# Patient Record
Sex: FEMALE | Race: ASIAN | NOT HISPANIC OR LATINO | Employment: FULL TIME | URBAN - METROPOLITAN AREA
[De-identification: names, ages, dates, MRNs, and addresses within clinical notes are randomized per-mention and may not be internally consistent; named-entity substitution may affect disease eponyms.]

---

## 2023-02-20 LAB
ERYTHROCYTE DISTRIBUTION WIDTH (RATIO) BY AUTOMATED COUNT: 13.4 % (ref 11.5–14.5)
ERYTHROCYTE MEAN CORPUSCULAR HEMOGLOBIN CONCENTRATION (G/DL) BY AUTOMATED: 32.3 G/DL (ref 32–36)
ERYTHROCYTE MEAN CORPUSCULAR VOLUME (FL) BY AUTOMATED COUNT: 99 FL (ref 80–100)
ERYTHROCYTES (10*6/UL) IN BLOOD BY AUTOMATED COUNT: 3.8 X10E12/L (ref 4–5.2)
HEMATOCRIT (%) IN BLOOD BY AUTOMATED COUNT: 37.8 % (ref 36–46)
HEMOGLOBIN (G/DL) IN BLOOD: 12.2 G/DL (ref 12–16)
LEUKOCYTES (10*3/UL) IN BLOOD BY AUTOMATED COUNT: 6.1 X10E9/L (ref 4.4–11.3)
PLATELETS (10*3/UL) IN BLOOD AUTOMATED COUNT: 271 X10E9/L (ref 150–450)
REFLEX ADDED, ANEMIA PANEL: ABNORMAL

## 2023-02-21 LAB
ABO GROUP (TYPE) IN BLOOD: NORMAL
ANTIBODY SCREEN: NORMAL
CHLAMYDIA TRACH., AMPLIFIED: NEGATIVE
ESTIMATED AVERAGE GLUCOSE FOR HBA1C: 97 MG/DL
HEMOGLOBIN A1C/HEMOGLOBIN TOTAL IN BLOOD: 5 %
HEPATITIS B VIRUS SURFACE AG PRESENCE IN SERUM: NONREACTIVE
HEPATITIS C VIRUS AB PRESENCE IN SERUM: NONREACTIVE
HIV 1/ 2 AG/AB SCREEN: NONREACTIVE
N. GONORRHEA, AMPLIFIED: NEGATIVE
RH FACTOR: NORMAL
RUBELLA VIRUS IGG AB: POSITIVE
SYPHILIS TOTAL AB: NONREACTIVE
URINE CULTURE: NORMAL

## 2023-03-31 ENCOUNTER — OFFICE VISIT (OUTPATIENT)
Dept: PRIMARY CARE | Facility: CLINIC | Age: 33
End: 2023-03-31
Payer: COMMERCIAL

## 2023-03-31 VITALS
OXYGEN SATURATION: 98 % | DIASTOLIC BLOOD PRESSURE: 76 MMHG | HEART RATE: 80 BPM | TEMPERATURE: 98.2 F | RESPIRATION RATE: 16 BRPM | WEIGHT: 174 LBS | BODY MASS INDEX: 29.87 KG/M2 | SYSTOLIC BLOOD PRESSURE: 122 MMHG

## 2023-03-31 DIAGNOSIS — B30.9 ACUTE VIRAL CONJUNCTIVITIS OF BOTH EYES: Primary | ICD-10-CM

## 2023-03-31 DIAGNOSIS — J01.90 ACUTE BACTERIAL SINUSITIS: ICD-10-CM

## 2023-03-31 DIAGNOSIS — B96.89 ACUTE BACTERIAL SINUSITIS: ICD-10-CM

## 2023-03-31 DIAGNOSIS — H00.034 CELLULITIS OF LEFT UPPER EYELID: ICD-10-CM

## 2023-03-31 PROCEDURE — 1036F TOBACCO NON-USER: CPT | Performed by: NURSE PRACTITIONER

## 2023-03-31 PROCEDURE — 99204 OFFICE O/P NEW MOD 45 MIN: CPT | Performed by: NURSE PRACTITIONER

## 2023-03-31 RX ORDER — FOLIC ACID 1 MG/1
1 TABLET ORAL DAILY
COMMUNITY
End: 2023-11-10 | Stop reason: ALTCHOICE

## 2023-03-31 RX ORDER — DIPHENHYDRAMINE HCL 25 MG
25 CAPSULE ORAL EVERY 6 HOURS PRN
COMMUNITY
End: 2023-11-10 | Stop reason: ALTCHOICE

## 2023-03-31 RX ORDER — AMOXICILLIN AND CLAVULANATE POTASSIUM 875; 125 MG/1; MG/1
1 TABLET, FILM COATED ORAL 2 TIMES DAILY
Qty: 14 TABLET | Refills: 0 | Status: SHIPPED | OUTPATIENT
Start: 2023-03-31 | End: 2023-04-07

## 2023-03-31 RX ORDER — PYRIDOXINE HCL (VITAMIN B6) 25 MG
50 TABLET ORAL 3 TIMES DAILY
COMMUNITY
End: 2023-11-10 | Stop reason: ALTCHOICE

## 2023-03-31 ASSESSMENT — ENCOUNTER SYMPTOMS
PHOTOPHOBIA: 1
EYE REDNESS: 1
EYE DISCHARGE: 1
EYE ITCHING: 1
EYE PAIN: 1

## 2023-03-31 ASSESSMENT — VISUAL ACUITY: OU: 1

## 2023-03-31 NOTE — PROGRESS NOTES
Subjective   Patient ID: Lisa Bernal is a 32 y.o. female who presents for Conjunctivitis.    Conjunctivitis   The current episode started 3 to 5 days ago. The onset was sudden. The problem occurs occasionally. The problem has been unchanged. Associated symptoms include eye itching, photophobia, ear discharge, eye discharge, eye pain and eye redness. The eye pain is moderate. Both eyes are affected. The eyelid exhibits redness and swelling.    States she had clear drainage, but changed to blood-tinged from left eye with increased pressure in left eyelid beginning this a.m. She did start Tobramycin eye drops on Monday.  Of note, pt. Is 14 weeks pregnant.    Review of Systems   HENT:  Positive for ear discharge.    Eyes:  Positive for photophobia, pain, discharge, redness and itching.       Objective   /76   Pulse 80   Temp 36.8 °C (98.2 °F) (Temporal)   Resp 16   Wt 78.9 kg (174 lb)   LMP 08/04/2022   SpO2 98%   BMI 29.87 kg/m²     Physical Exam  Vitals reviewed.   Constitutional:       Appearance: Normal appearance.   HENT:      Head: Normocephalic.   Eyes:      General: Vision grossly intact.         Right eye: Discharge present.         Left eye: Discharge present.     Conjunctiva/sclera:      Right eye: Right conjunctiva is injected.      Left eye: Left conjunctiva is injected. Chemosis present.      Comments: Left upper eyelid very tender on light palpation w/ erythema and edema of the lid.    Cardiovascular:      Rate and Rhythm: Normal rate and regular rhythm.      Pulses: Normal pulses.   Pulmonary:      Breath sounds: Normal breath sounds.   Abdominal:      General: Bowel sounds are normal.      Palpations: Abdomen is soft.   Musculoskeletal:      Cervical back: Neck supple.   Skin:     General: Skin is warm and dry.   Neurological:      General: No focal deficit present.      Mental Status: She is alert.   Psychiatric:         Mood and Affect: Mood normal.         Thought Content: Thought  content normal.       Assessment/Plan   Problem List Items Addressed This Visit    None  Visit Diagnoses       Acute viral conjunctivitis of both eyes    -  Primary    Cellulitis of left upper eyelid        Acute bacterial sinusitis        Relevant Medications    amoxicillin-pot clavulanate (Augmentin) 875-125 mg tablet          Refer to optometry d/t reported bloody drainage from left eye this a.m.

## 2023-05-27 ENCOUNTER — HOSPITAL ENCOUNTER (OUTPATIENT)
Dept: DATA CONVERSION | Facility: HOSPITAL | Age: 33
End: 2023-05-27
Attending: STUDENT IN AN ORGANIZED HEALTH CARE EDUCATION/TRAINING PROGRAM
Payer: COMMERCIAL

## 2023-05-27 DIAGNOSIS — Z34.80 ENCOUNTER FOR SUPERVISION OF OTHER NORMAL PREGNANCY, UNSPECIFIED TRIMESTER (HHS-HCC): ICD-10-CM

## 2023-05-27 DIAGNOSIS — N63.0 UNSPECIFIED LUMP IN UNSPECIFIED BREAST: ICD-10-CM

## 2023-05-27 DIAGNOSIS — Z3A.22 22 WEEKS GESTATION OF PREGNANCY (HHS-HCC): ICD-10-CM

## 2023-05-27 DIAGNOSIS — O92.29: ICD-10-CM

## 2023-05-27 LAB
ABO GROUP (TYPE) IN BLOOD: NORMAL
ANTIBODY SCREEN: NORMAL
BASOPHILS (10*3/UL) IN BLOOD BY AUTOMATED COUNT: 0.01 X10E9/L (ref 0–0.1)
BASOPHILS/100 LEUKOCYTES IN BLOOD BY AUTOMATED COUNT: 0.1 % (ref 0–2)
EOSINOPHILS (10*3/UL) IN BLOOD BY AUTOMATED COUNT: 0.03 X10E9/L (ref 0–0.7)
EOSINOPHILS/100 LEUKOCYTES IN BLOOD BY AUTOMATED COUNT: 0.4 % (ref 0–6)
ERYTHROCYTE DISTRIBUTION WIDTH (RATIO) BY AUTOMATED COUNT: 13.3 % (ref 11.5–14.5)
ERYTHROCYTE MEAN CORPUSCULAR HEMOGLOBIN CONCENTRATION (G/DL) BY AUTOMATED: 32.4 G/DL (ref 32–36)
ERYTHROCYTE MEAN CORPUSCULAR VOLUME (FL) BY AUTOMATED COUNT: 99 FL (ref 80–100)
ERYTHROCYTES (10*6/UL) IN BLOOD BY AUTOMATED COUNT: 3.17 X10E12/L (ref 4–5.2)
HEMATOCRIT (%) IN BLOOD BY AUTOMATED COUNT: 31.5 % (ref 36–46)
HEMOGLOBIN (G/DL) IN BLOOD: 10.2 G/DL (ref 12–16)
IMMATURE GRANULOCYTES/100 LEUKOCYTES IN BLOOD BY AUTOMATED COUNT: 0.3 % (ref 0–0.9)
LEUKOCYTES (10*3/UL) IN BLOOD BY AUTOMATED COUNT: 7.7 X10E9/L (ref 4.4–11.3)
LYMPHOCYTES (10*3/UL) IN BLOOD BY AUTOMATED COUNT: 1.12 X10E9/L (ref 1.2–4.8)
LYMPHOCYTES/100 LEUKOCYTES IN BLOOD BY AUTOMATED COUNT: 14.6 % (ref 13–44)
MONOCYTES (10*3/UL) IN BLOOD BY AUTOMATED COUNT: 0.58 X10E9/L (ref 0.1–1)
MONOCYTES/100 LEUKOCYTES IN BLOOD BY AUTOMATED COUNT: 7.6 % (ref 2–10)
NEUTROPHILS (10*3/UL) IN BLOOD BY AUTOMATED COUNT: 5.91 X10E9/L (ref 1.2–7.7)
NEUTROPHILS/100 LEUKOCYTES IN BLOOD BY AUTOMATED COUNT: 77 % (ref 40–80)
NRBC (PER 100 WBCS) BY AUTOMATED COUNT: 0 /100 WBC (ref 0–0)
PLATELETS (10*3/UL) IN BLOOD AUTOMATED COUNT: 273 X10E9/L (ref 150–450)
RH FACTOR: NORMAL

## 2023-05-30 LAB
GRAM STAIN: ABNORMAL
TISSUE/WOUND CULTURE/SMEAR: ABNORMAL

## 2023-06-28 LAB
ERYTHROCYTE DISTRIBUTION WIDTH (RATIO) BY AUTOMATED COUNT: 13.7 % (ref 11.5–14.5)
ERYTHROCYTE MEAN CORPUSCULAR HEMOGLOBIN CONCENTRATION (G/DL) BY AUTOMATED: 32.1 G/DL (ref 32–36)
ERYTHROCYTE MEAN CORPUSCULAR VOLUME (FL) BY AUTOMATED COUNT: 99 FL (ref 80–100)
ERYTHROCYTES (10*6/UL) IN BLOOD BY AUTOMATED COUNT: 3.12 X10E12/L (ref 4–5.2)
GLUCOSE, 1 HR SCREEN, PREG: 113 MG/DL
HEMATOCRIT (%) IN BLOOD BY AUTOMATED COUNT: 30.8 % (ref 36–46)
HEMOGLOBIN (G/DL) IN BLOOD: 9.9 G/DL (ref 12–16)
LEUKOCYTES (10*3/UL) IN BLOOD BY AUTOMATED COUNT: 8.5 X10E9/L (ref 4.4–11.3)
PLATELETS (10*3/UL) IN BLOOD AUTOMATED COUNT: 319 X10E9/L (ref 150–450)
REFLEX ADDED, ANEMIA PANEL: ABNORMAL

## 2023-06-29 LAB
FERRITIN, PREGNANCY: 51 UG/L
FOLATE, SERUM, PREGNANCY: 15.8 NG/ML
IRON (UG/DL) IN SER/PLAS IN PREGNANCY: 60 UG/DL
IRON BINDING CAPACITY (UG/DL) IN PREGNANCY: 441 UG/DL
IRON SATURATION (%) IN PREGNANCY: 14 %
VITAMIN B12, PREGNANCY: 237 PG/ML

## 2023-07-08 LAB
GRAM STAIN: NORMAL
TISSUE/WOUND CULTURE/SMEAR: NORMAL

## 2023-07-14 LAB
ERYTHROCYTE DISTRIBUTION WIDTH (RATIO) BY AUTOMATED COUNT: 14.6 % (ref 11.5–14.5)
ERYTHROCYTE MEAN CORPUSCULAR HEMOGLOBIN CONCENTRATION (G/DL) BY AUTOMATED: 31.6 G/DL (ref 32–36)
ERYTHROCYTE MEAN CORPUSCULAR VOLUME (FL) BY AUTOMATED COUNT: 100 FL (ref 80–100)
ERYTHROCYTES (10*6/UL) IN BLOOD BY AUTOMATED COUNT: 3.1 X10E12/L (ref 4–5.2)
HEMATOCRIT (%) IN BLOOD BY AUTOMATED COUNT: 31 % (ref 36–46)
HEMOGLOBIN (G/DL) IN BLOOD: 9.8 G/DL (ref 12–16)
HEMOGLOBIN (PG) IN RETICULOCYTES: 32 PG (ref 28–38)
LEUKOCYTES (10*3/UL) IN BLOOD BY AUTOMATED COUNT: 6.8 X10E9/L (ref 4.4–11.3)
PLATELETS (10*3/UL) IN BLOOD AUTOMATED COUNT: 293 X10E9/L (ref 150–450)
REFLEX ADDED, ANEMIA PANEL: ABNORMAL
RETICULOCYTES (10*3/UL) IN BLOOD: 0.07 X10E12/L (ref 0.02–0.08)
RETICULOCYTES/100 ERYTHROCYTES IN BLOOD BY AUTOMATED COUNT: 2.4 % (ref 0.5–2)

## 2023-07-15 LAB
FERRITIN, PREGNANCY: 38 UG/L
FOLATE, SERUM, PREGNANCY: 9.3 NG/ML
IRON (UG/DL) IN SER/PLAS IN PREGNANCY: 62 UG/DL
IRON BINDING CAPACITY (UG/DL) IN PREGNANCY: 480 UG/DL
IRON SATURATION (%) IN PREGNANCY: 13 %
VITAMIN B12, PREGNANCY: 166 PG/ML

## 2023-08-26 PROBLEM — J06.9 VIRAL URI: Status: ACTIVE | Noted: 2023-08-26

## 2023-08-26 PROBLEM — L02.91 ABSCESS: Status: ACTIVE | Noted: 2023-08-26

## 2023-08-26 PROBLEM — N64.4 BREAST PAIN: Status: ACTIVE | Noted: 2023-08-26

## 2023-08-26 PROBLEM — R92.8 ABNORMAL MAMMOGRAM OF RIGHT BREAST: Status: ACTIVE | Noted: 2023-08-26

## 2023-08-26 PROBLEM — F41.9 ANXIETY: Status: ACTIVE | Noted: 2023-08-26

## 2023-08-26 PROBLEM — N63.10 LUMP OF RIGHT BREAST: Status: ACTIVE | Noted: 2023-08-26

## 2023-08-26 PROBLEM — N64.59 ABNORMAL BREAST EXAM: Status: ACTIVE | Noted: 2023-08-26

## 2023-08-26 PROBLEM — O99.019 ANEMIA IN PREGNANCY (HHS-HCC): Status: ACTIVE | Noted: 2023-08-26

## 2023-08-26 RX ORDER — DOCUSATE SODIUM 100 MG/1
100 CAPSULE, LIQUID FILLED ORAL 2 TIMES DAILY PRN
COMMUNITY
Start: 2021-06-11 | End: 2024-03-11 | Stop reason: ALTCHOICE

## 2023-08-26 RX ORDER — IBUPROFEN 600 MG/1
600 TABLET ORAL EVERY 6 HOURS PRN
COMMUNITY
Start: 2021-06-11 | End: 2024-03-11 | Stop reason: ALTCHOICE

## 2023-08-26 RX ORDER — AMOXICILLIN 500 MG/1
CAPSULE ORAL
COMMUNITY
Start: 2022-10-06 | End: 2023-11-02 | Stop reason: ALTCHOICE

## 2023-08-26 RX ORDER — DROSPIRENONE 4 MG/1
1 TABLET, FILM COATED ORAL DAILY
COMMUNITY
Start: 2021-06-11 | End: 2023-11-10 | Stop reason: ALTCHOICE

## 2023-08-26 RX ORDER — ACYCLOVIR 800 MG/1
800 TABLET ORAL
COMMUNITY
Start: 2023-05-09 | End: 2023-11-10 | Stop reason: ALTCHOICE

## 2023-08-26 RX ORDER — FERROUS SULFATE 325(65) MG
65 TABLET ORAL 2 TIMES DAILY
COMMUNITY
End: 2023-11-10 | Stop reason: ALTCHOICE

## 2023-08-26 RX ORDER — GABAPENTIN 100 MG/1
100 CAPSULE ORAL 2 TIMES DAILY
COMMUNITY
Start: 2023-05-09 | End: 2023-11-10 | Stop reason: ALTCHOICE

## 2023-08-26 RX ORDER — TOBRAMYCIN 3 MG/ML
SOLUTION/ DROPS OPHTHALMIC
COMMUNITY
Start: 2023-05-24 | End: 2023-11-10 | Stop reason: ALTCHOICE

## 2023-08-26 RX ORDER — CEPHALEXIN 500 MG/1
500 CAPSULE ORAL 4 TIMES DAILY
COMMUNITY
Start: 2023-05-24 | End: 2023-11-02 | Stop reason: ALTCHOICE

## 2023-08-26 RX ORDER — ACETAMINOPHEN 500 MG
1000 TABLET ORAL EVERY 6 HOURS PRN
COMMUNITY
Start: 2021-06-11 | End: 2023-11-10 | Stop reason: ALTCHOICE

## 2023-09-07 VITALS — BODY MASS INDEX: 30.3 KG/M2 | WEIGHT: 177.47 LBS | HEIGHT: 64 IN

## 2023-09-09 LAB — GROUP B STREP SCREEN: NORMAL

## 2023-09-30 NOTE — PROGRESS NOTES
"    Current Stage:   Stage: Triage     Subjective Data:   Antepartum:  Antepartum:    31yo  at 22.0wga by LMP c/w 7wk u/s who presents for breast mass.     Pt reports noticing R breast pain about 2 weeks ago, noticed walnut-sized mass along R lateral breast. Since then, has noticed it enlarging, significantly tender. Was Rx'd keflex 4x/day on Wednesday, but noticed it continued to enlarge, golf ball sized  this morning. Pain improved with warm compresses, tylenol. Notes small amount of \"colostrum\" expressed, no other discharge. Denies fevers, chills, nausea, vomiting. Notes some mild decreased appetite.   Of note, patient had right breast mass biopsied 2020 that showed fibroadenoma. She then had a mammogram and breast ultrasound 2022 for right lower breast pain x2 weeks that had no mammographic findings. Pt feels current symptoms are in a different  spot with different features.     Pregnancy notable for:   - trigeminal neuralgia in pregnancy - s/p course of acyclovir 2023, symptoms now resolved.     OBHx:   - 2021  at 39wks, F  - SAB at 6wks  GYNHx:   PMHx: Anxiety, fibroadenoma of breast ()  PSHx: Denies  SHx: Denies tob/etoh/recreational drug use; CNP at [facility]  FHx: Pancreatic cancer (father)  Allergies: Latex (hives)        Objective Information:    Objective Information:      T   P  R  BP   MAP  SpO2   Value  36.5  92  16  111/62   79  98%  Date/Time  10:45  10:45  10:45  10:45   10:45  10:44  Range  (36.5C - 36.5C )  (92 - 92 )  (16 - 16 )  (111 - 111 )/ (62 - 62 )  (79 - 79 )  (98% - 98% )      Pain reported at  10:45: 4 = Moderate      Physical Exam:   Constitutional: Awake, alert, resting comfortably   Obstetric: FHT: 150   Eyes: Clear sclera   ENMT: MMM   Head/Neck: Atraumatic   Respiratory/Thorax: 6cm firm, tender, non-fluctuant  mass along R outer breast extending to subareola. Small drop of yellow fluid at nipple.   Cardiovascular: Warm and " well-perfused   Gastrointestinal: Gravid   Extremities: Moving all four extremities   Neurological: Alert and oriented, no gross motor  or sensory deficits   Psychological: Appropriate affect   Skin: Warm and dry     Recent Lab Results:    Results:    CBC: 2023 11:16              \     Hgb     /                              \     10.2 L    /  WBC  ----------------  Plt               7.7       ----------------    273              /     Hct     \                              /     31.5 L    \            RBC: 3.17 L    MCV: 99     Neutrophil %: 77.0     Testing:   NST Interpretation - Baby A:  ·  Baseline      Assessment and Plan:   Assessment:    33 yo  at 22.0wga by LMP c/w 7wk US presenting for breast mass    Breast mass  - VSS, no si/sx of systemic infection   - c/f purulent nipple discharge - culture   - breast US without evidence of abscess - plan for additional breast imaging to rule out possible malignancy. Req given for mammogram and repeat breast US.   - surg onc c/s - appreciate recs. Likely fibroadenoma vs developing abscess. Pt declines needle biopsy at this time, has appt scheduled for this week with breast surgery.    - continue keflex  - recommend continued tylenol, warm compresses at home to manage pain. Pt declines oxycodone Rx.  - return precautions given    Fetal wellbeing  - [place]+    Seen and discussed with Dr. Londono-Saurabh Amaya MD, PGY1       Attestation:   Note Completion:  I am a:  Resident/Fellow   Attending Attestation I saw and evaluated the patient.  I personally obtained the key and critical portions of the history and physical exam or was physically present for key and  critical portions performed by the resident/fellow. I reviewed the resident/fellow?s documentation and discussed the patient with the resident/fellow.  I agree with the resident/fellow?s medical decision making as documented in the note.     I personally evaluated the patient on  27-May-2023         Electronic Signatures:  Brittany Amaya (Resident))  (Signed 27-May-2023 18:17)   Authored: Current Stage, Subjective Data, Objective Data,   Testing, Assessment and Plan, Note Completion  Norma Flores)  (Signed 29-May-2023 21:41)   Authored: Assessment and Plan, Note Completion   Co-Signer: Assessment and Plan, Note Completion      Last Updated: 29-May-2023 21:41 by Norma Flores)

## 2023-10-02 ENCOUNTER — TELEPHONE (OUTPATIENT)
Dept: LACTATION | Facility: CLINIC | Age: 33
End: 2023-10-02

## 2023-10-02 ENCOUNTER — LACTATION CONSULT (OUTPATIENT)
Dept: LACTATION | Facility: CLINIC | Age: 33
End: 2023-10-02
Payer: COMMERCIAL

## 2023-10-02 ENCOUNTER — APPOINTMENT (OUTPATIENT)
Dept: OBSTETRICS AND GYNECOLOGY | Facility: CLINIC | Age: 33
End: 2023-10-02
Payer: COMMERCIAL

## 2023-10-02 DIAGNOSIS — O91.219: Primary | ICD-10-CM

## 2023-10-02 PROCEDURE — 99211 OFF/OP EST MAY X REQ PHY/QHP: CPT

## 2023-10-03 ASSESSMENT — PATIENT HEALTH QUESTIONNAIRE - PHQ9
SUM OF ALL RESPONSES TO PHQ9 QUESTIONS 1 AND 2: 0
SUM OF ALL RESPONSES TO PHQ9 QUESTIONS 1 AND 2: 0
2. FEELING DOWN, DEPRESSED OR HOPELESS: NOT AT ALL
1. LITTLE INTEREST OR PLEASURE IN DOING THINGS: NOT AT ALL
1. LITTLE INTEREST OR PLEASURE IN DOING THINGS: NOT AT ALL
2. FEELING DOWN, DEPRESSED OR HOPELESS: NOT AT ALL

## 2023-10-03 ASSESSMENT — COLUMBIA-SUICIDE SEVERITY RATING SCALE - C-SSRS: 1. IN THE PAST MONTH, HAVE YOU WISHED YOU WERE DEAD OR WISHED YOU COULD GO TO SLEEP AND NOT WAKE UP?: NO

## 2023-10-03 NOTE — PROGRESS NOTES
Lactation Counseling Note    Subjective:    Lisa Bernal is a 33 y.o. patient referred for lactation counseling. She is here today due to Engorgement, Low milk supply, and mastitis during pregnancy . She was referred by her  Edwige Manuel .     OB HISTORY:   Healthcare Provider: OB/GYN Edwige Manuel  /Para: : 2  Para: 2  Weeks Gestation: 39.3  Mode of Delivery: Normal vaginal route  Delivery Complications: None  Maternal Complications: granular mastitis  [place] Information: Baby's Name: Ashish  : 23  Place of Birth: [place]  Skin to skin contact: First hour  Birth weight: 7 lb 10 oz  Birth length: 19 inches  Discharge weight: 7 lb 1 oz    Objective:    BREASTFEEDING ASSESSMENT:   Breast Assessment: Medium, Engorged, and Plugged duct(s)    PATIENT DISCUSSION SUMMARY:    LACTATION PROBLEMS AND STRATEGIES:  Plugged ducts: Apply heat to area before feeding  Call physician if mom becomes sick or fever is greater than 100.4  Discuss use of lecithin with physician  Feed frequently to keep breast empty  Gently massage plug toward nipple while feeding  Lean over baby while nursing so gravity aids in dislodging the plug  Lubricate the nipple with olive oil  Massage affected breast before and during feeding  Nurse breast with plug first  Reduce saturated fats from your diet  Remove pressure on breast; tight bra, baby carrier, mom sleeping on stomach, etc  Review latch on positioning  Rub affected nipple with damp cloth to remove any excess skin  Try to express milk from plugged duct by compressing the area behind the plug  Watch for symptoms: Comes on gradually and may shift in location, pain is mild , mom feels well, no fever  PATIENT INSTRUCTION HANDOUTS GIVEN:   Tips for pumping with an electric breast pump and milk storage for your healthy baby (PI# [id number])  Foods that promote good milk production  LACTATION EDUCATION:  Waking Techniques, Correct Positioning, and Correct Latch On  Baby sleeps in  musa next to mom's bed

## 2023-10-03 NOTE — PROGRESS NOTES
Left Message - checking on mom and baby after consult yesterday. I offered follow up appt with me, or breast surgon or Breastfeeding medicine if desired

## 2023-10-04 ENCOUNTER — APPOINTMENT (OUTPATIENT)
Dept: OBSTETRICS AND GYNECOLOGY | Facility: CLINIC | Age: 33
End: 2023-10-04
Payer: COMMERCIAL

## 2023-10-11 ENCOUNTER — APPOINTMENT (OUTPATIENT)
Dept: OBSTETRICS AND GYNECOLOGY | Facility: CLINIC | Age: 33
End: 2023-10-11
Payer: COMMERCIAL

## 2023-10-12 ENCOUNTER — HOSPITAL ENCOUNTER (OUTPATIENT)
Dept: RADIOLOGY | Facility: HOSPITAL | Age: 33
Discharge: HOME | End: 2023-10-12
Payer: COMMERCIAL

## 2023-10-12 ENCOUNTER — OFFICE VISIT (OUTPATIENT)
Dept: SURGICAL ONCOLOGY | Facility: HOSPITAL | Age: 33
End: 2023-10-12
Payer: COMMERCIAL

## 2023-10-12 VITALS
DIASTOLIC BLOOD PRESSURE: 92 MMHG | TEMPERATURE: 97.3 F | WEIGHT: 188.3 LBS | BODY MASS INDEX: 32.32 KG/M2 | HEART RATE: 69 BPM | SYSTOLIC BLOOD PRESSURE: 146 MMHG

## 2023-10-12 DIAGNOSIS — N63.22 MASS OF UPPER INNER QUADRANT OF LEFT BREAST: ICD-10-CM

## 2023-10-12 DIAGNOSIS — N63.22 MASS OF UPPER INNER QUADRANT OF LEFT BREAST: Primary | ICD-10-CM

## 2023-10-12 DIAGNOSIS — N61.1 BREAST ABSCESS: ICD-10-CM

## 2023-10-12 PROCEDURE — 99214 OFFICE O/P EST MOD 30 MIN: CPT | Performed by: SURGERY

## 2023-10-12 PROCEDURE — 76642 ULTRASOUND BREAST LIMITED: CPT | Mod: LT

## 2023-10-12 PROCEDURE — 76642 ULTRASOUND BREAST LIMITED: CPT | Mod: LEFT SIDE | Performed by: STUDENT IN AN ORGANIZED HEALTH CARE EDUCATION/TRAINING PROGRAM

## 2023-10-12 PROCEDURE — 1036F TOBACCO NON-USER: CPT | Performed by: SURGERY

## 2023-10-12 PROCEDURE — 87075 CULTR BACTERIA EXCEPT BLOOD: CPT | Mod: CMCLAB | Performed by: SURGERY

## 2023-10-12 ASSESSMENT — PAIN SCALES - GENERAL: PAINLEVEL: 3

## 2023-10-12 NOTE — PROGRESS NOTES
Chief complaint  Granulomatous mastitis right breast new area left breast    History of Present Illness  Lisa Bernal (Nan) is a pleasant 32 year old female referred by Lin Kenney to the breast center originally for a right breast mass. She was evaluated in the ER on on 5/27/2023 for possible breast abscess. Ultrasound was showed no evidence of abscess. At 9:00 the mass was palpated the size of a golf ball and firm. No fever or chills and was started on Keflex. She had greenish white discharge from her nipple. Since she was previously seen in the ER she felt it has grown in size. She has no family history of breast cancer. She denies breast surgery but has history of right breast core biopsy, benign fibroadenoma. On 5/31/2023 she had an ultrasound guided core biopsy of the right breast indicating benign breast tissue with abscess formation, acute and chronic inflammation with granuloma and giant cell reaction. She recently completed 14 days of Clindamycin. She presents today with increased tenderness, redness, clear nipple discharge, and with the breast mass increasing in size. She takes as needed tylenol and has been using warm compresses. No fever or chills. She is currently 26 weeks pregnant with her second child.  She had an ultrasound which showed what appeared to be a thick abscess now. She was continued on clindamycin. Because the thought that this was granulomatous mastitis she was started on a Medrol pack after discussion with OB. She has finished this. Is having less pain. Area still feels hard. She had an aspiration number weeks ago. Culture was sent and did not have any growth just granulocytes. She was on antibiotics at that time.    She is having periodic drainage 1 area of the nipple and one inferiorly. She has been off antibiotics for 3 weeks now. She thinks continues to get better. She thinks the area is softer although it seems a little firmer medially. She still gets some drainage. No fevers  or chills.  She has delivered her child everything went well.  She has a new mass left breast she has been pumping and this looks like it does have some purulent material in its.  There has been no drainage from the mass.  On the right she has had some drainage from the area inferior to the nipple as well as an area medially.      REPRODUCTIVE HISTORY: menarche age 9, , first birth age 30,  x 9 months, OCP's x 3-4 years, premenopausal, LMP 22 (22 weeks pregnant)    FAMILY CANCER HISTORY:  Father: Pancreatic cancer, age 55    Ultrasound: Left breast showed what appeared to be an abscess with thick loculated fluid.  Was similar to what she had on the right.  It was not suspicious for malignancy.    Physical exam  Right breast relatively soft.  She has fluctuance in the draining area below the nipple and somewhat firm around an area that has been draining medially and inferiorly.  Left breast firm mass at the 9 o'clock position extending to the nipple approximately 5 x 4 cm.  Some redness in the skin.  Milky discharge left  Palpable but soft lymph node left axilla no adenopathy on the right.    Procedure:  Right breast cleaned with ChloraPrep and alcohol and treated with 1% lidocaine aspirated for approximately 6 cc of purulent material.  This was sent for culture dry dressing applied.  Left breast cleaned with ChloraPrep infiltrated 1% lidocaine.  Aspirated approximately 5 cc of purulent and somewhat bloody fluid.  Dry dressing applied tolerated well sent for culture    Assessment continue draining tracts of the right fibrinolytics mastitis.  Overall stable to somewhat better.  New lesion on the left which appears to be granulomatous mastitis also.  Cultures pending.    Plan doxycycline 100 mg twice daily if cultures are negative otherwise will await sensitivities.  Follow-up in 3 weeks.

## 2023-10-15 LAB
BACTERIA FLD CULT: NORMAL
BACTERIA FLD CULT: NORMAL
GRAM STN SPEC: NORMAL

## 2023-11-01 NOTE — PROGRESS NOTES
Chief complaint  Granulomatous mastitis right breast new area left breast    History of Present Illness  Lisa Bernal (Nan) is a pleasant 32 year old female referred by Lin Kenney to the breast center originally for a right breast mass. She was evaluated in the ER on on 5/27/2023 for possible breast abscess. Ultrasound was showed no evidence of abscess. At 9:00 the mass was palpated the size of a golf ball and firm. No fever or chills and was started on Keflex. She had greenish white discharge from her nipple. Since she was previously seen in the ER she felt it has grown in size. She has no family history of breast cancer. She denies breast surgery but has history of right breast core biopsy, benign fibroadenoma. On 5/31/2023 she had an ultrasound guided core biopsy of the right breast indicating benign breast tissue with abscess formation, acute and chronic inflammation with granuloma and giant cell reaction. She recently completed 14 days of Clindamycin. She presents today with increased tenderness, redness, clear nipple discharge, and with the breast mass increasing in size. She takes as needed tylenol and has been using warm compresses. No fever or chills. She is currently 26 weeks pregnant with her second child.  She had an ultrasound which showed what appeared to be a thick abscess now. She was continued on clindamycin. Because the thought that this was granulomatous mastitis she was started on a Medrol pack after discussion with OB. She has finished this. Is having less pain. Area still feels hard. She had an aspiration number weeks ago. Culture was sent and did not have any growth just granulocytes. She was on antibiotics at that time.    She is having periodic drainage 1 area of the nipple and one inferiorly. She has been off antibiotics for 3 weeks now. She thinks continues to get better. She thinks the area is softer although it seems a little firmer medially. She still gets some drainage. No fevers  or chills.  She has delivered her child everything went well.  She has a new mass left breast she has been pumping and this looks like it does have some purulent material in its.  There has been no drainage from the mass.  On the right she has had some drainage from the area inferior to the nipple as well as an area medially.      REPRODUCTIVE HISTORY: menarche age 9, , first birth age 30,  x 9 months, OCP's x 3-4 years, premenopausal, LMP 22 (22 weeks pregnant)    FAMILY CANCER HISTORY:  Father: Pancreatic cancer, age 55    Ultrasound: Left breast showed what appeared to be an abscess with thick loculated fluid.  Was similar to what she had on the right.  It was not suspicious for malignancy.    Physical exam  Right breast relatively soft.  She has no fluctuance in the draining area below the nipple and softer around an area that has been draining medially and inferiorly.  Left breast firm mass at the 9 o'clock position extending to the nipple approximately 4 cm.  Softer some redness in the skin.   Palpable but soft lymph node left axilla no adenopathy on the right.      Assessment continue draining tracts of the right but less.  Left had some drainage both sides are  better.  Discussed starting antibiotics but we will hold off unless things do not continue to improve cultures were negative    Plan follow-up exam in 1 month.  Consider antibiotics if it does not continue to get better

## 2023-11-02 ENCOUNTER — OFFICE VISIT (OUTPATIENT)
Dept: SURGICAL ONCOLOGY | Facility: HOSPITAL | Age: 33
End: 2023-11-02
Payer: COMMERCIAL

## 2023-11-02 VITALS — HEART RATE: 73 BPM | DIASTOLIC BLOOD PRESSURE: 77 MMHG | SYSTOLIC BLOOD PRESSURE: 105 MMHG | TEMPERATURE: 96.6 F

## 2023-11-02 DIAGNOSIS — N61.1 BREAST ABSCESS: Primary | ICD-10-CM

## 2023-11-02 DIAGNOSIS — N63.22 MASS OF UPPER INNER QUADRANT OF LEFT BREAST: ICD-10-CM

## 2023-11-02 PROCEDURE — 99213 OFFICE O/P EST LOW 20 MIN: CPT | Performed by: SURGERY

## 2023-11-02 PROCEDURE — 1036F TOBACCO NON-USER: CPT | Performed by: SURGERY

## 2023-11-02 ASSESSMENT — PAIN SCALES - GENERAL: PAINLEVEL: 0-NO PAIN

## 2023-11-08 ENCOUNTER — APPOINTMENT (OUTPATIENT)
Dept: OBSTETRICS AND GYNECOLOGY | Facility: CLINIC | Age: 33
End: 2023-11-08
Payer: COMMERCIAL

## 2023-11-10 ENCOUNTER — POSTPARTUM VISIT (OUTPATIENT)
Dept: OBSTETRICS AND GYNECOLOGY | Facility: CLINIC | Age: 33
End: 2023-11-10
Payer: COMMERCIAL

## 2023-11-10 VITALS — BODY MASS INDEX: 30.73 KG/M2 | WEIGHT: 179 LBS | SYSTOLIC BLOOD PRESSURE: 124 MMHG | DIASTOLIC BLOOD PRESSURE: 74 MMHG

## 2023-11-10 DIAGNOSIS — O13.9 GESTATIONAL HYPERTENSION, ANTEPARTUM (HHS-HCC): ICD-10-CM

## 2023-11-10 DIAGNOSIS — K59.00 DYSCHEZIA: ICD-10-CM

## 2023-11-10 PROCEDURE — 0503F POSTPARTUM CARE VISIT: CPT

## 2023-11-10 ASSESSMENT — EDINBURGH POSTNATAL DEPRESSION SCALE (EPDS)
I HAVE BEEN ANXIOUS OR WORRIED FOR NO GOOD REASON: YES, SOMETIMES
THE THOUGHT OF HARMING MYSELF HAS OCCURRED TO ME: NEVER
I HAVE FELT SAD OR MISERABLE: NO, NOT AT ALL
I HAVE BEEN SO UNHAPPY THAT I HAVE HAD DIFFICULTY SLEEPING: NOT VERY OFTEN
TOTAL SCORE: 10
I HAVE BLAMED MYSELF UNNECESSARILY WHEN THINGS WENT WRONG: NOT VERY OFTEN
I HAVE BEEN ABLE TO LAUGH AND SEE THE FUNNY SIDE OF THINGS: NOT QUITE SO MUCH NOW
THINGS HAVE BEEN GETTING ON TOP OF ME: YES, SOMETIMES I HAVEN'T BEEN COPING AS WELL AS USUAL
I HAVE BEEN SO UNHAPPY THAT I HAVE BEEN CRYING: ONLY OCCASIONALLY
I HAVE FELT SCARED OR PANICKY FOR NO GOOD REASON: NO, NOT MUCH
I HAVE LOOKED FORWARD WITH ENJOYMENT TO THINGS: RATHER LESS THAN I USED TO

## 2023-11-10 NOTE — PROGRESS NOTES
Patient being seen for 6 WK PPV.  Last PAP: 20 normal HPV -  Vaginal Delivery: 23 (Had Epidural)  Vaginal tear  Patient having pain while passing stool.     Ellen Chin MA  Subjective   33 y.o.  presenting for postpartum follow-up   Delivery Date: 23  Intrapartum complications: gHTN- Patient denies any HA, visual disturbances, increased swelling, upper belly pain, heartburn, SOB, or chest pain.  BP in office normal today, Patient reports elevated bp's 140's/90's when initially pp and for two weeks, though blood pressures now have returned to normal   Pregnancy Problems (from 23 to 11/10/23)       No problems associated with this episode.            Pt feeling physically and emotionally well.   Postpartum Depression: High Risk (11/10/2023)    [place]  Depression Scale     Last EPDS Total Score: 10     Last EPDS Self Harm Result: Never   .   Baby is sleeping well, and patient is sleeping well.     PP Recovery:   Pain: controlled  Lacerations: labial  Perineal discomfort: none  Lochia: none  Feeding Method: She is pumping exclusively.     Lactation Consult Needed?: No  Birth Trauma: No  Bonding with Baby: well with baby boy, Ashish  Sexual Intimacy: No  Contraceptive Method: None  Depression - Denies s/s depression.    Postpartum Depression: High Risk (11/10/2023)    [place]  Depression Scale     Last EPDS Total Score: 10     Last EPDS Self Harm Result: Never       Patient reports that she has not been feeling much like herself lately and she has been feeling a bit depressed. Patient thinks that her feeling may be situational as she is adjusting to a new infant at home while having a toddler, and both of her parents have recently fallen.   Emotional Support - Sister and SO  Bowel Symptoms - Negative for abdominal discomfort, blood in stool or black stool, and negative change in bowel habits.   Bladder Symptoms - No dysuria, denies hematuria, urinary frequency,  urinary urgency or incontinence.   Menses Since Delivery - Resumed/not?    Patient reports tailbone pain just prior to defecation and throughout. Patient reports that pain is sharp and starts above her butt crack and shoots down and into her rectum. Patient reports that pain is only during periods of defecation and she has no pain at other times. Patient reports normal BM and denies constipation. Patient has hemorrhoids before and states that this feels different. GI referral offered at this time. Patient declines for now, but will alert this provider if symptoms worsen.     Physical Exam  HENT:      Mouth/Throat:      Mouth: Mucous membranes are moist.   Eyes:      Pupils: Pupils are equal, round, and reactive to light.   Neck:      Thyroid: No thyroid mass, thyromegaly or thyroid tenderness.   Cardiovascular:      Rate and Rhythm: Normal rate and regular rhythm.      Pulses: Normal pulses.   Pulmonary:      Effort: Pulmonary effort is normal.      Breath sounds: Normal breath sounds.   Chest:   Breasts:     Right: Mass and skin change present.      Left: Mass and skin change present.      Comments: Patient being followed by breast clinic for Granulomatous mastitis  Abdominal:      General: Abdomen is flat.      Palpations: Abdomen is soft.      Hernia: There is no hernia in the left inguinal area or right inguinal area.   Genitourinary:     General: Normal vulva.      Uterus: Normal.       Adnexa: Right adnexa normal and left adnexa normal.   Musculoskeletal:         General: Normal range of motion.      Cervical back: Normal range of motion.   Lymphadenopathy:      Cervical: No cervical adenopathy.      Right cervical: No superficial, deep or posterior cervical adenopathy.     Left cervical: No superficial, deep or posterior cervical adenopathy.      Lower Body: No right inguinal adenopathy. No left inguinal adenopathy.   Skin:     General: Skin is warm.      Capillary Refill: Capillary refill takes less than 2  seconds.   Neurological:      Mental Status: She is alert and oriented to person, place, and time.   Psychiatric:         Mood and Affect: Mood normal.         Behavior: Behavior normal.          Plan      A/P. 33 y.o. now , s/p , normal recovery course  Last pap:  nml, next due in   Postpartum contraception discussed - patient elects none at this time  Referral to [facility] for possible ppd and counseling. Encouraged patient to continue to monitor for signs or symptoms of  mood and anxiety disorders  Dyschezia- patient to alert this provider if symptoms worsen, or do not improve  Routine follow up with PCP for health maintenance examination encouraged including TSH, cholesterol and vitamin D evaluation.  Warning s/s discussed  All questions answered    F/U as needed and for routine annual exam     DARREL Hendrickson

## 2023-12-07 NOTE — PROGRESS NOTES
[facility] [place]  Lisa Bernal female   1990 33 y.o.   [id number]      Chief Complaint  Follow up granulomatous mastitis.    History Of Present Illness  Lisa Bernal is a 33 y.o. femaleLisa Bernal (Nan) is a pleasant 32 year old female referred by Lin Kenney to the breast center originally for a right breast mass. She was evaluated in the ER on on 5/27/2023 for possible breast abscess. Ultrasound was showed no evidence of abscess. At 9:00 the mass was palpated the size of a golf ball and firm. No fever or chills and was started on Keflex. She had greenish white discharge from her nipple. Since she was previously seen in the ER she felt it has grown in size. She has no family history of breast cancer. She denies breast surgery but has history of right breast core biopsy, benign fibroadenoma. On 5/31/2023 she had an ultrasound guided core biopsy of the right breast indicating benign breast tissue with abscess formation, acute and chronic inflammation with granuloma and giant cell reaction. She recently completed 14 days of Clindamycin. She presents today with increased tenderness, redness, clear nipple discharge, and with the breast mass increasing in size. She takes as needed tylenol and has been using warm compresses. No fever or chills. She is currently 26 weeks pregnant with her second child.    She had an ultrasound which showed what appeared to be a thick abscess now. She was continued on clindamycin. Because the thought that this was granulomatous mastitis she was started on a Medrol pack after discussion with OB. She has finished this. Is having less pain. Area still feels hard. She had an aspiration number weeks ago. Culture was sent and did not have any growth just granulocytes. She was on antibiotics at that time.     She is having periodic drainage 1 area of the nipple and one inferiorly. She has been off antibiotics for 3 weeks now. She thinks continues to get better. She thinks the area is  softer although it seems a little firmer medially. She still gets some drainage. No fevers or chills.  She has delivered her child everything went well.    She has a new mass left breast she has been pumping and this looks like it does have some purulent material in its.  There has been no drainage from the mass.  On the right she has had some drainage from the area inferior to the nipple as well as an area medially.     REPRODUCTIVE HISTORY: menarche age 9, , first birth age 30,  x 9 months, OCP's x 3-4 years, premenopausal,      FAMILY CANCER HISTORY:  Father: Pancreatic cancer, age 55      Review of Systems  Constitutional:  Negative for appetite change, fatigue, fever and unexpected weight change.   HENT:  Negative for ear pain, hearing loss, nosebleeds, sore throat and trouble swallowing.    Eyes:  Negative for discharge, itching and visual disturbance.   Breast: As stated in HPI.  Respiratory:  Negative for cough, chest tightness and shortness of breath.    Cardiovascular:  Negative for chest pain, palpitations and leg swelling.   Gastrointestinal:  Negative for abdominal pain, constipation, diarrhea and nausea.   Endocrine: Negative for cold intolerance and heat intolerance.   Genitourinary:  Negative for dysuria, frequency, hematuria, pelvic pain and vaginal bleeding.   Musculoskeletal:  Negative for arthralgias, back pain, gait problem, joint swelling and myalgias.   Skin:  Negative for color change and rash.   Allergic/Immunologic: Negative for environmental allergies and food allergies.   Neurological:  Negative for dizziness, tremors, speech difficulty, weakness, numbness and headaches.   Hematological:  Does not bruise/bleed easily.   Psychiatric/Behavioral:  Negative for agitation, dysphoric mood and sleep disturbance. The patient is not nervous/anxious.      Past Medical History  She has a past medical history of Chemical pregnancy, Encounter for immunization, and Fibroadenoma of  breast.    Surgical History  She has a past surgical history that includes MR angio head wo IV contrast (12/11/2020) and MR angio neck wo IV contrast (12/11/2020).    Family History  Cancer-related family history includes Liver cancer in her father; Pancreatic cancer in her father; Prostate cancer in her father.     Social History  She reports that she has never smoked. She has never used smokeless tobacco. She reports that she does not currently use alcohol. She reports that she does not use drugs.    Allergies  Latex    Medications  Current Outpatient Medications   Medication Instructions    docusate sodium (COLACE) 100 mg, oral, 2 times daily PRN    ibuprofen 600 mg, oral, Every 6 hours PRN    prenatal vit no.124/iron/folic (PRENATAL VITAMIN ORAL) No dose, route, or frequency recorded.       Last Recorded Vitals  There were no vitals filed for this visit.     Physical Exam  Physical Exam  Patient is alert and oriented x3 and in a relaxed and appropriate mood. Her gait is steady and hand grasps are equal. Sclera is clear. The breasts are nearly symmetrical. The tissue is soft without palpable abnormalities, discrete nodules or masses. The skin and nipples appear normal. There is no cervical, supraclavicular or axillary lymphadenopathy. Heart rate and rhythm normal, S1 and S2 appreciated. The lungs are clear to auscultation bilaterally. Abdomen is soft and non-tender.     Relevant Results and Imaging  No results found for this or any previous visit from the past 365 days.      Time was spent viewing digital images. I explained the results in depth, along with suggested explanation for follow up recommendations based on the testing results. BI-RADS Category ***    Orders  No orders of the defined types were placed in this encounter.      Visit Diagnosis  No diagnosis found.      Assessment/Plan       Plan:  ***    Patient Discussion/Summary      You can see your health information, review clinical summaries from  office visits & test results online when you follow your health with MY  Chart, a personal health record. To sign up go to [email or web address]. If you need assistance with signing up or trouble getting into your account call [employer] Patient Line 24/7 at [phone].    My office phone number is [phone] if you need to get in touch with me or have additional questions or concerns. Thank you for choosing [facility] and trusting me as your healthcare provider. I look forward to seeing you again at your next office visit. I am honored to be a provider on your health care team and I remain dedicated to helping you achieve your health goals.    Darline Ro, FABIAN-CNP

## 2023-12-08 ENCOUNTER — APPOINTMENT (OUTPATIENT)
Dept: SURGICAL ONCOLOGY | Facility: HOSPITAL | Age: 33
End: 2023-12-08
Payer: COMMERCIAL

## 2023-12-13 ENCOUNTER — OFFICE VISIT (OUTPATIENT)
Dept: BEHAVIORAL HEALTH | Facility: CLINIC | Age: 33
End: 2023-12-13
Payer: COMMERCIAL

## 2023-12-13 DIAGNOSIS — F43.23 ADJUSTMENT DISORDER WITH MIXED ANXIETY AND DEPRESSED MOOD: ICD-10-CM

## 2023-12-13 PROCEDURE — 99215 OFFICE O/P EST HI 40 MIN: CPT | Performed by: CLINICAL NURSE SPECIALIST

## 2023-12-13 PROCEDURE — 1036F TOBACCO NON-USER: CPT | Performed by: CLINICAL NURSE SPECIALIST

## 2023-12-13 RX ORDER — SERTRALINE HYDROCHLORIDE 50 MG/1
50 TABLET, FILM COATED ORAL DAILY
Qty: 90 TABLET | Refills: 0 | Status: SHIPPED | OUTPATIENT
Start: 2023-12-13 | End: 2024-02-21 | Stop reason: SDUPTHER

## 2023-12-13 NOTE — PROGRESS NOTES
Subjective   Patient ID: Lisa Bernal is a 33 y.o. female MF referred by CNM for evaluation. PMH Mastitis,  Lived w/ 3 yo daughter, and      Two month postpartum after delivering baby boy, Ashish, on 23 via  after  planned pregnancy  at 39wga. Denies traumatic delivery.  FOB. At one month pp developed intense anger,  especially at small things  was doing, ie leaving clothes on floor, cleaning her breast pump, etc. to point of crying. No yelling, but takes a lot to repress it. ANXIETY: Feeling more anxious than depressed. Feels flash, struggles with resentment, overwhelmed. 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 Developed mastitis in during second trimester May 2023, granuloma type, treated by Breast Surgeon. Attempted to BF,but pumping and now weaning.      At six week pp visit, expressed concerns about not enough time for each child, RTW,  and anxiety for moving baby into his room from basement where she and baby sleep. Mood endorsing anhedonia, exhaustion, rumination of all she has to do, no social isolation. Good family support, feels connected to baby ,  helpful Reports anxious, and excited to go back to work, Sleep in basement. getting 5-6 hours, Robby; eat sporadically through day, Energy has enough 80% of time. At 9 week pp, hard to get OOB perform ADLs, lacks motivation to do household chores, yet motivated to RTW. ANXIETY: Feeling more anxious than depressed. Developed mastitis in during second trimester May 2023, granuloma type, treated by Breast Surgeon. Attempted to BF,but pumping and now weaning.        ROS   ANXIETY: MICHAEL score N=21 and positive screen , denies panic, some Intrusive Thoughts of what she cold be doing , ie shopping .cleaning, setting holiday, no time or energy for TO DO lists.   DEPRESSION: EPNDS Score = 13 NEVER on #10, positive screen, PHQ-9 = & and Mild Depression     PTSD : ACES = 1  CSA, denies intrusive recall. ,  SUBSTANCE USE: Use  ETOH socially   PSYCHOSIS Denies   NOBLE Denies MQD Score = 2 and No problem                  OB/GYN Hx   Pregnancy # 1 EAB  at 7wga via self managed medical , some sadness, No bother by images, some sadness next day, was worried.     Pregnancy # 2 6/10/21 girls Demetrius. No diagnosed antepartum and postpartum  anxiety.  Anxiety. Worried to end of first trimester   HPI  Review of Systems   All other systems reviewed and are negative.  Objective   Physical Exam  Psychiatric:         Attention and Perception: Attention normal.         Mood and Affect: Mood is anxious and depressed. Affect is tearful.         Speech: Speech normal.         Behavior: Behavior normal.         Thought Content: Thought content normal.         Cognition and Memory: Cognition normal.         Judgment: Judgment normal.       PMH   Mastitis   Medication PNVIs,   ALLERGIES LATEX Rash     PMH   No Out Patient Treatment, Admission, SI Hx or past medication trials     FPH   Maternal None   Paternal None   CAMILLE no completed SI    PSYCHSOCIAL   Born in  [place], raised in [place]   Parents / togther   Older of 2 girls    Education MS    Employ since              Assessment/Plan   IMP: 32 yo MF  referred by CNM for evaluation. PMH Mastitis,  Lived w/ 1 yo daughter, 2 aleks old baby and .  APRN       No prior mental health hx. Two month postpartum after delivering baby boyAshish, on 23 via  after  planned pregnancy  at 39wga.  At one month pp developed intense anger,  increased anxiety, overwhelmed, some depressed mood with anhedonia and low motivation but more anxiety, struggles with resentment, overwhelmed. 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 Low Risk for Haem due to untreated anxiety. Protected by baby in home ,, employed, help seeking.     Discussed s/sx PMAD, treatment and prognosis. Reviewed r/b/a for use of Sertraline to target anxiety with option to add  Wellbutrin as needed for motivation. Educated on self care, optimize sleep, delegate tasks, and time for self,  Plan to wean in next few weeks.   Recommended referral for ind therapist as well .     Diagnoses   Adjustment Disorder w/ Anxiety and Depression Peripartum Onset   Two month pp  Mastitis     Treatment   Begin Sertrlaine 50 mg take 1.2 tb po qa x 7n days then increase to 1 tab po qam # 90 NRF   Refer to Elena Macias for ind therapy  RTC one month   Refer to online resources ie PPSI, Support Groups,   Contact provider via [employer]

## 2024-01-17 ENCOUNTER — APPOINTMENT (OUTPATIENT)
Dept: BEHAVIORAL HEALTH | Facility: CLINIC | Age: 34
End: 2024-01-17
Payer: COMMERCIAL

## 2024-01-24 ENCOUNTER — OFFICE VISIT (OUTPATIENT)
Dept: BEHAVIORAL HEALTH | Facility: CLINIC | Age: 34
End: 2024-01-24
Payer: COMMERCIAL

## 2024-01-24 DIAGNOSIS — F41.9 ANXIETY: ICD-10-CM

## 2024-01-24 PROCEDURE — 99215 OFFICE O/P EST HI 40 MIN: CPT | Performed by: CLINICAL NURSE SPECIALIST

## 2024-01-24 PROCEDURE — 1036F TOBACCO NON-USER: CPT | Performed by: CLINICAL NURSE SPECIALIST

## 2024-01-24 NOTE — PROGRESS NOTES
Subjective   Patient ID: Lisa Bernal is a 33 y.o. female who presents for MICHAEL, Peripartum. Mother of 1 yo girl and 4 month old boy.      INTERIM: Began Sertraline 50mg po qam. Mild SEs of GI sx. Reports less anxious, able to prioritize, not this clear  thinking in a while, other notice her more calm, Stopped pumping,   Sleeping better, making marked difference improved energy, focus. RTW several weeks ago and functioning well. Baby achieving developmental milestone. Patient addressing  changes in increasing sleep, asking for help, engaged in self care.      HPI  Review of Systems   All other systems reviewed and are negative.  Objective   Physical Exam  Psychiatric:         Attention and Perception: Attention normal.         Mood and Affect: Mood normal.         Speech: Speech normal.         Behavior: Behavior normal. Behavior is cooperative.         Thought Content: Thought content normal.         Cognition and Memory: Cognition and memory normal.         Judgment: Judgment normal.     Assessment/Plan   IMP: 32 yo MF who presents for management of MICHAEL, Peripartum. Lives with , of 1 yo girl and baby boy.  Employee. Presents at 4 month postpartum. Started Sertraline 50 mg one month ago, denies SEs. Report mood and anxiety markedly improved. RTW several weeks ago, and adjusting well to new routine. Discussed adding lifestyle changes, exercise as able.         Diagnoses   MICHAEL Peripartum   Four months pp    Treatment  Continue Sertraline 50 mg po qam sufficient supply   RTC 4-6 weeks   Refer for ind therapy y w/  [facility].   Contact provider as needed via [employer]

## 2024-02-21 ENCOUNTER — OFFICE VISIT (OUTPATIENT)
Dept: BEHAVIORAL HEALTH | Facility: CLINIC | Age: 34
End: 2024-02-21
Payer: COMMERCIAL

## 2024-02-21 DIAGNOSIS — F43.23 ADJUSTMENT DISORDER WITH MIXED ANXIETY AND DEPRESSED MOOD: ICD-10-CM

## 2024-02-21 PROCEDURE — 99213 OFFICE O/P EST LOW 20 MIN: CPT | Performed by: CLINICAL NURSE SPECIALIST

## 2024-02-21 PROCEDURE — 1036F TOBACCO NON-USER: CPT | Performed by: CLINICAL NURSE SPECIALIST

## 2024-02-21 RX ORDER — SERTRALINE HYDROCHLORIDE 50 MG/1
50 TABLET, FILM COATED ORAL DAILY
Qty: 90 TABLET | Refills: 0 | Status: SHIPPED | OUTPATIENT
Start: 2024-02-21 | End: 2025-02-20

## 2024-02-21 NOTE — PROGRESS NOTES
"Subjective   Patient ID: Lisa Bernal is a 33 y.o. female who presents for Adjustment Disorder peripartum.    INTERIM: Now 4 month pp. Taking Sertraline 50 mg po qam mood and anxiety appear well controlled, and reports feeling more like herself ie able to complete tasks, less hindered by anxiety, able to prioritize and execute plans. No further rumination, \"what if\" or catastrophic thinking.  away for 3 week, and pt caring for both 3 yo and 4 month old on own with help from family. Since last meeting working out 3 x per week, noting more energy, motivation. Denies SEs.      HPI  Review of Systems   All other systems reviewed and are negative.  Objective   Physical Exam  Psychiatric:         Attention and Perception: Attention and perception normal.         Mood and Affect: Mood and affect normal.         Speech: Speech normal.         Behavior: Behavior normal. Behavior is cooperative.         Thought Content: Thought content normal.         Cognition and Memory: Cognition and memory normal.         Judgment: Judgment normal.     Assessment/Plan   IMP: 34 yo MF who presents for management of Adjustment Disorder w/ Anxiety, Peripartum. Lives with , of 3 yo girl and baby boy.  Employee. Now at  4 month postpartum. Continues on  Sertraline 50 mg and tolerating well, no SEs. Mood and anxiety well controlled. 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 Added exercise 3 times pee week, optimistic toward future.         Diagnoses   Adjustment Disorder Peripartum   Four months pp     Treatment  Continue Sertraline 50 mg po qam sufficient supply   RTC 8  weeks   Refer for ind therapy y w/  [facility].   Contact provider as needed via [employer]         "

## 2024-02-28 ENCOUNTER — SOCIAL WORK (OUTPATIENT)
Dept: BEHAVIORAL HEALTH | Facility: CLINIC | Age: 34
End: 2024-02-28
Payer: COMMERCIAL

## 2024-02-28 PROCEDURE — 90791 PSYCH DIAGNOSTIC EVALUATION: CPT

## 2024-02-29 NOTE — PROGRESS NOTES
Referred to therapy by: Sandy Albrecht       Reason for Referral: Reason for Referral:   Anger Issues: Irritability, frustration primarily towards her  Anxiety Disorder: Patient has been having intrusive thoughts since the birth of her son.  Other: Patient has been struggling to care for herself and her children.      PSYCHOSOCIAL HISTORIES  Living Environment: Lives at home with her two children and . Patient's children are two years old and five.   Social support network: (family, friends, support group, other) , sister, friends and co-workers.   [place] Spiritual orientation: None   Gender Identity and sexual orientation: Female   Recent losses or deaths: Denies       CHIEF COMPLAINT  CHIEF COMPLAINT SUMMARY:   Patient is a [age] y/o female with past mental health hx of postpartum depression and adjustment disorder. Patient lives at home with her  and their two children. Past has support from her , family members, friends and co-workers. Patient never sought mental health services in the past. Patient recently started Sandy Trena and was started on Zoloft. Patient states having issues with anxiety most of her adult life. Patient's symptoms worsened after the birth of her son. Patient reports a six weeks postpartum not wanting to get out of bed. Patient reports neglecting her hygiene and finding it challenging to care for her children. Patient describes changing/feeding her son and thinking about the other things that she needed to accomplish. Patient stopped wanting to interact with her toddler. Patient was refusing to drive with the children in the car with her. Patient reports having intrusive thoughts that her son will be hurt while sleeping. Patient will rearrange furniture to ensure that it won't fall on him.     Patient reports that her symptoms have improved since starting medication. Patient is still having issues with intrusive thoughts. Patient would like to  focus on self-care in therapy. Patient struggles with feelings of guilt when she takes time for herself. Patient denies HI/SI and psychosis. Patient is not judged to be danger to herself or others.     HISTORY OF PRESENT ILLNESS   Current Providers:    Psychiatrist:  Sandy Albrecht     What precipitated this most recent episode? Present stressors? Patient states that having increased anxiety since the birth of her second child. Patient identifies symptoms has intrusive thoughts, decreased concentration, fatigue and feeling overwhelmed. Patient was refusing to drive with the children in the car. Patient states finding it difficult to care for both herself and her children. Patient had been refusing to drive with the children in the car with her.     Prior mental health/substance abuse treatment: Denies     Acute mental health symptoms:  Suicidal Ideation: Denies   Homicidal Ideation: Denies   Psychosis: Denies     Level of functioning impairment at work/home/school now: Moderate    Patient started medication and this helped improve her daily functioning.       Substance abuse hx:  Tobacco, vaping: Denies   Alcohol: Denies   Illicit drugs: Denies     Significant medical hx: Metastasis       Education Hx:   Highest level of education: Masters degree in nursing   Hx of learning disability/IEP:       Work Hx:  Are you currently working?  Yes   Occupation: Nurse practitioner   Full Time:          How has your illness impacted your work performance? Denies     FAMILY HISTORY:  None     Hx of Abuse/Trauma History: Denies     What barriers do you see interfering with your ability to attend therapy: None     Goals patient wants to work on while attending therapy 2-3: Patient would like to focus more on self-care     Biggest strengths and healthy coping strategies: Patient is willing to ask for help and has a strong support system.     Mental Status Exam   General appearance & grooming: Appropriate      Motor activity:   Appropriate       Behavior: Cooperative       Adaptive Equipment: None   Speech: Appropriate     Clear        Mood: Euthymic       Affect: Mood Congruent     Thought process:  Appropriate     Logical       Thoughtcontent: Appropriate       Cognition: No impairment, Orientation: Alert & Oriented x 3  Insight:  Aware of problem     Unaware of problem     Minimization     Projection of blame     Denial     Other  Eye contact: Average       Judgment: Judgment intact       Interview Behavior: Appropriate       Hallucinations: None       Delusions: Absent       PATIENT DISCUSSION/SUMMARY  PLAN:      Patient is a [age] y/o female with past mental health hx of postpartum depression and adjustment disorder. Patient has support from her , family members, friends and co-workers. Patient states having issues with anxiety most of her adult life. Patient's symptoms worsened after the birth of her son. Patient identifies mental health symptoms as intrusive thoughts, ruminating on the things she needs to do, fatigue, decreased motivation and irritability/frustration towards her . Patient would like to focus on self-care in therapy. Patient denies HI/SI and psychosis. Patient is not judged to be danger to herself or others.

## 2024-03-06 NOTE — CONSULTS
Service:   Service: Surgical Oncology     History of Present Illness:   HPI:    MOON HERNANDEZ is a 32 year old Female 22 weeks gestation with her second child with a PMH of biopsy diagnosed fibroadenoma (2020) presenting today for right breast  pain and an enlarging mass. Patient states that she first noticed a marble-sized mass last Thursday which has since enlarged to golf-ball sized with increasing pain at the area. The mass is at the 9:00 position and does not involve the nipple/areolar  complex. Also stating that she has had some whitish/greenish discharge from the nipple. Stating that the pain feels similar to when she had a clogged duct with her first pregnancy. Is not currently breastfeeding. Has been of keflex since Wednesday PM  per her midwife. Denies fever, chills, chest pain or shortness of breath. No skin changes to the area. Ne leukocytosis on labs. US performed without evidence of abscess. Surgical oncology consulted for evaluation of the breast mass.    PMH: fibroadenoma  PSH: none  Allergies: latex  SocHx: non- smoker, npo alcohol use, currently 22 wks pregnant with her second child    Review Family/Social History and ROS:   Social History:    Smoking Status: never smoker  (1)   Alcohol Use: denies (1)   Drug Use: denies  (1)   Drug 2 Use: denies  (1)            Allergies:  ·  NKDA :   ·  Latex : Rash    Objective:     Objective Information:        T   P  R  BP   MAP  SpO2   Value  36.8  81  16  125/58   78  100%  Date/Time 5/27 15:14 5/27 15:14 5/27 15:14 5/27 15:14  5/27 15:14 5/27 15:14  Range  (36.5C - 36.8C )  (81 - 92 )  (16 - 16 )  (111 - 125 )/ (58 - 62 )  (78 - 79 )  (98% - 100% )    Physical Exam by System:    Constitutional: NAD, awake, alert, resting comfortably  in bed   Head/Neck: NCAT   Musculoskeletal: GUTIERREZ x 4   Extremities: no edema   Neurological: grossly intact   Breast: right breast with palpable, firm, golf-ball  sized mass at the 9:00 position, tender to palpation without  overlying skin changes. no fluctuance of the mass, scant whitish discharge from the right nipple   Skin: warm and dry     Recent Lab Results:    Results:        I have reviewed these laboratory results:    Complete Blood Count + Differential  27-May-2023 11:16:00      Result Value    White Blood Cell Count  7.7    Nucleated Erythrocyte Count  0.0    Red Blood Cell Count  3.17   L   HGB  10.2   L   HCT  31.5   L   MCV  99    MCHC  32.4    PLT  273    RDW-CV  13.3    Neutrophil %  77.0    Immature Granulocytes %  0.3    Lymphocyte %  14.6    Monocyte %  7.6    Eosinophil %  0.4    Basophil %  0.1    Neutrophil Count  5.91    Lymphocyte Count  1.12   L   Monocyte Count  0.58    Eosinophil Count  0.03    Basophil Count  0.01        Radiology Results:    Results:        Impression:    No definite evidence of an abscess in the palpable area of concern in  the right breast. Breast malignancy remains a consideration and  diagnostic imaging at the breast center is necessary for further  workup.      Ultrasound Chest [May 27 2023  3:42PM]        Assessment:    Lisa Bernal is a 34 yo F with a PMH of biopsy proven fibroadenoma of the R breast presenting for new painful mass of the R breast at 22 weeks gestation with her  second child. Firm, palpable, golf-ball sized mass at the 9:00 position of the right breast. US chest without evidence of abscess. WBC 7.7. Afebrile, VSS. No overlying skin changes. No fluctuance of the mass. Scant whitish nipple discharge. Patient saw  Dr. Billingsley for fibroadenoma back in 2020, which self- resolved. Has been on keflex since wednesday PM.     Plan:  - discussed attempting aspiration at bedside for symptomatic relief, she would like to wait at this time  - continue Keflex  - patient has appt with Dr. Billingsley in clinic on Thursday, will move appt to Tuesday   - instructed to continue to monitor for fever, chills, skin changes or change in discharge    Brittany Quinn MD  General Surgery,  "[id number]  Surgical Oncology, [id number]     Attestation:   Note Completion:  I am a:  Resident/Fellow   Attending Attestation I reviewed the resident/fellow?s documentation and discussed the patient with the resident/fellow.  I agree with the resident/fellow?s medical  decision making as documented in their note with the exception/addition of the following:    Comments/ Additional Findings    Probable abscess with increase in size of mass and pain. Recommended aspiration she did not want Will see in office. NOt a candidate for mammogram  given pregnancy Will get US if does not want aspiration          Electronic Signatures:  Wally Billingsley)  (Signed 28-May-2023 07:45)   Authored: Note Completion   Co-Signer: History of Present Illness, Objective, Assessment/Recommendations, Note Completion  Brittany Quinn (Resident))  (Signed 27-May-2023 19:21)   Authored: Service, History of Present Illness, Review  Family/Social History and ROS, Allergies, Objective, Assessment/Recommendations, Note Completion      Last Updated: 28-May-2023 07:45 by Wally Billingsley)    References:  1.  Data Referenced From \"Triage Note - OB v4\" 27-May-2023 10:39   "

## 2024-03-07 ENCOUNTER — APPOINTMENT (OUTPATIENT)
Dept: PRIMARY CARE | Facility: CLINIC | Age: 34
End: 2024-03-07
Payer: COMMERCIAL

## 2024-03-11 ENCOUNTER — OFFICE VISIT (OUTPATIENT)
Dept: PRIMARY CARE | Facility: CLINIC | Age: 34
End: 2024-03-11
Payer: COMMERCIAL

## 2024-03-11 VITALS
HEART RATE: 84 BPM | SYSTOLIC BLOOD PRESSURE: 100 MMHG | HEIGHT: 64 IN | RESPIRATION RATE: 20 BRPM | WEIGHT: 178 LBS | DIASTOLIC BLOOD PRESSURE: 70 MMHG | TEMPERATURE: 98.1 F | BODY MASS INDEX: 30.39 KG/M2

## 2024-03-11 DIAGNOSIS — M79.661 RIGHT CALF PAIN: Primary | ICD-10-CM

## 2024-03-11 PROCEDURE — 99214 OFFICE O/P EST MOD 30 MIN: CPT | Performed by: NURSE PRACTITIONER

## 2024-03-11 ASSESSMENT — ENCOUNTER SYMPTOMS
SHORTNESS OF BREATH: 0
FATIGUE: 1
COUGH: 1
CHILLS: 0
FEVER: 0
PALPITATIONS: 0
WHEEZING: 0

## 2024-03-11 NOTE — PROGRESS NOTES
"Prasanth Bernal \"Aisha\" is a 33 y.o. female who presents for Leg Pain (Rt calf pain x2 wks. Intermittent and randomly. She has been trying to stretch it out but its not helping.  No redness or warmth, just pain. ).  HPI The patient is a non-smoker. She has a 5-month old infant son. She does not recall any recent injury.  Review of Systems   Constitutional:  Positive for fatigue (has a 5 -month old and a 2 year old at home.). Negative for chills and fever.   Respiratory:  Positive for cough (mild cold.). Negative for shortness of breath and wheezing.    Cardiovascular:  Negative for chest pain, palpitations and leg swelling.   Objective   Physical Exam  Constitutional:       Appearance: Normal appearance.   Cardiovascular:      Rate and Rhythm: Normal rate.      Pulses: Normal pulses.      Heart sounds: No murmur heard.     Comments: Negative Aneesh's sign  Pulmonary:      Effort: Pulmonary effort is normal.      Breath sounds: Normal breath sounds.   Musculoskeletal:      Right lower leg: No edema.      Left lower leg: No edema.   Skin:     General: Skin is warm and dry.   Neurological:      General: No focal deficit present.      Mental Status: She is alert and oriented to person, place, and time.     /70   Pulse 84   Temp 36.7 °C (98.1 °F)   Resp 20   Ht 1.626 m (5' 4\")   Wt 80.7 kg (178 lb)   BMI 30.55 kg/m²   Assessment/Plan   Problem List Items Addressed This Visit    None  Visit Diagnoses       Right calf pain    -  Primary    Relevant Orders    Vascular US lower extremity venous duplex right          Follow-up as needed.         "

## 2024-03-21 ENCOUNTER — SOCIAL WORK (OUTPATIENT)
Dept: BEHAVIORAL HEALTH | Facility: CLINIC | Age: 34
End: 2024-03-21
Payer: COMMERCIAL

## 2024-03-21 PROCEDURE — 90837 PSYTX W PT 60 MINUTES: CPT

## 2024-03-21 NOTE — PROGRESS NOTES
Collaborative Care (CoCM)  Progress Note    Type of Interaction: Virtual    Start Time: 9:00am    End Time: 9:45am     Patient states the past several weeks have been stressful. Patient's father was admitted to the ICU. He was discharged home yesterday and is being cared for by her mother. Patient reports that her father has had medical issues for some time. Patient recently found out that she's pregnant again. Patient doesn't plan on moving forward with the pregnancy. Processed recent stressors with patient, offered support. Patient is unsure if she will tell her family about the pregnancy. Patient's  is supportive of her decision. Patient reports that her intrusive thoughts have decreased. Patient doesn't worry when she puts her son to bed. Patient thinks being busy has helped distract her from her thoughts. Discussed with patient meeting again in two weeks.     Appointment: Scheduled      Interventions Provided: Solution Focused Therapy      Progress Made: Significant    Response to Intervention: Receptive         Plan:   Encouraged patient to take things one day at a time. Will plan on meeting again in two weeks and discuss if further therapy is needed.         Follow Up / Next Appointment:  April 2nd at 3pm

## 2024-03-27 ENCOUNTER — HOSPITAL ENCOUNTER (OUTPATIENT)
Dept: RADIOLOGY | Facility: HOSPITAL | Age: 34
Discharge: HOME | End: 2024-03-27
Payer: COMMERCIAL

## 2024-03-27 DIAGNOSIS — M79.661 RIGHT CALF PAIN: ICD-10-CM

## 2024-03-27 PROCEDURE — 93971 EXTREMITY STUDY: CPT | Performed by: RADIOLOGY

## 2024-03-27 PROCEDURE — 93971 EXTREMITY STUDY: CPT

## 2024-04-02 ENCOUNTER — SOCIAL WORK (OUTPATIENT)
Dept: BEHAVIORAL HEALTH | Facility: CLINIC | Age: 34
End: 2024-04-02
Payer: COMMERCIAL

## 2024-04-02 PROCEDURE — 90834 PSYTX W PT 45 MINUTES: CPT
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
## 2024-04-17 ENCOUNTER — APPOINTMENT (OUTPATIENT)
Dept: BEHAVIORAL HEALTH | Facility: CLINIC | Age: 34
End: 2024-04-17
Payer: COMMERCIAL

## 2024-04-30 ENCOUNTER — SOCIAL WORK (OUTPATIENT)
Dept: BEHAVIORAL HEALTH | Facility: CLINIC | Age: 34
End: 2024-04-30
Payer: COMMERCIAL

## 2024-04-30 PROCEDURE — 90832 PSYTX W PT 30 MINUTES: CPT | Mod: AJ,95

## 2024-04-30 NOTE — PROGRESS NOTES
Collaborative Care (CoCM)  Progress Note    Type of Interaction: Virtual    Start Time: 2:00pm    End Time: 2:30pm    Patient reports that her father's health has declined significantly this past month. Patient states that he now needs assistive devices to ambulate around the house. Patient reports that it's been difficult to watch him decline. Patient states that despite his long illness it will be difficult for herself and her family when he passes. Discussed anticipatory grief with patient, validated feelings. Encouraged patient to allow herself to express her emotions regarding her father. Patient states despite her father's illness she has been doing well. Patient is starting to feel like herself again. Patient doesn't feel that she needs to continue with therapy at this time. Patient will contact this provider if needed. Patient plans to continue with medication and will meet with Sandy Albrecht every two-three months.     Appointment: No scheduled         Interventions Provided: Solution Focused Therapy      Progress Made: Significant    Response to Intervention: Receptive         Plan:   Encouraged patient to allow herself to feel her emotions regarding her father's illness.       Follow Up / Next Appointment:  Patient reports doing well and coping appropriately with life stressors. Patient states that she doesn't need to continue with therapy at this time.

## 2024-06-06 ENCOUNTER — TELEMEDICINE (OUTPATIENT)
Dept: BEHAVIORAL HEALTH | Facility: CLINIC | Age: 34
End: 2024-06-06
Payer: COMMERCIAL

## 2024-06-06 DIAGNOSIS — F43.23 ADJUSTMENT DISORDER WITH MIXED ANXIETY AND DEPRESSED MOOD: ICD-10-CM

## 2024-06-06 PROCEDURE — 99213 OFFICE O/P EST LOW 20 MIN: CPT | Performed by: CLINICAL NURSE SPECIALIST

## 2024-06-06 RX ORDER — CLINDAMYCIN HYDROCHLORIDE 300 MG/1
CAPSULE ORAL
COMMUNITY
Start: 2023-06-07 | End: 2024-06-06 | Stop reason: WASHOUT

## 2024-06-06 RX ORDER — CHOLECALCIFEROL (VITAMIN D3) 25 MCG
TABLET,CHEWABLE ORAL
COMMUNITY
End: 2024-06-06 | Stop reason: WASHOUT

## 2024-06-06 RX ORDER — METHYLPREDNISOLONE 4 MG/1
TABLET ORAL
COMMUNITY
Start: 2023-06-29 | End: 2024-06-06 | Stop reason: WASHOUT

## 2024-06-06 NOTE — PROGRESS NOTES
"Subjective   Patient ID: Lisa Bernal \"Aisha\" is a 33 y.o. female who presents for Adjustment Disorder w/ Anxiety during Postpartum.   HPI  Review of Systems   Psych Review of Symptoms  Objective   Physical Exam  INTERIM: Reports father aged 67  2 weeks ago due to pancreatic cancer, prolonged illness, had celebration of life. RTW yesterday was manageable.    Now 8 months postpartum. Sleeping getting better. Mood appropriately sad and grieving but overall stable, anxiety tolerable. Has support of , family.            Assessment/Plan   IMP: 34 yo MF who presents for management of Adjustment Disorder w/ Anxiety, Peripartum. Lives with , of 1 yo girl and baby boy.  Employee. Now at  8 month postpartum. Continues on  Sertraline 50 mg and tolerating well, no SEs. Recent death of father, and pt managing home and work well. Mood overall stable. No indications for any medication changes /increase. Aims to exercise 3 times per week, optimistic toward future.         Diagnoses   Adjustment Disorder Peripartum   Eight months pp     Treatment  Continue Sertraline 50 mg po qam renewed for 90 days y   RTC 3 month    Refer for ind therapy y w/  Focus.   Contact provider as needed via [facility]               " no

## 2024-06-12 DIAGNOSIS — F43.23 ADJUSTMENT DISORDER WITH MIXED ANXIETY AND DEPRESSED MOOD: ICD-10-CM

## 2024-06-18 RX ORDER — SERTRALINE HYDROCHLORIDE 50 MG/1
TABLET, FILM COATED ORAL
Qty: 90 TABLET | Refills: 0 | Status: SHIPPED | OUTPATIENT
Start: 2024-06-18

## 2024-06-18 RX ORDER — SERTRALINE HYDROCHLORIDE 50 MG/1
50 TABLET, FILM COATED ORAL DAILY
Qty: 90 TABLET | Refills: 0 | Status: SHIPPED | OUTPATIENT
Start: 2024-06-18 | End: 2025-06-18

## 2024-10-10 ENCOUNTER — PHARMACY VISIT (OUTPATIENT)
Dept: PHARMACY | Facility: CLINIC | Age: 34
End: 2024-10-10
Payer: COMMERCIAL

## 2024-10-10 PROCEDURE — RXMED WILLOW AMBULATORY MEDICATION CHARGE

## 2024-10-10 RX ORDER — CIPROFLOXACIN AND DEXAMETHASONE 3; 1 MG/ML; MG/ML
SUSPENSION/ DROPS AURICULAR (OTIC)
Qty: 7.5 ML | Refills: 0 | OUTPATIENT
Start: 2024-10-10

## 2024-12-16 DIAGNOSIS — F43.23 ADJUSTMENT DISORDER WITH MIXED ANXIETY AND DEPRESSED MOOD: ICD-10-CM

## 2024-12-16 RX ORDER — SERTRALINE HYDROCHLORIDE 50 MG/1
TABLET, FILM COATED ORAL
Qty: 90 TABLET | Refills: 0 | OUTPATIENT
Start: 2024-12-16

## 2025-03-10 ENCOUNTER — APPOINTMENT (OUTPATIENT)
Dept: PRIMARY CARE | Facility: CLINIC | Age: 35
End: 2025-03-10
Payer: COMMERCIAL

## 2025-03-10 VITALS
SYSTOLIC BLOOD PRESSURE: 110 MMHG | RESPIRATION RATE: 17 BRPM | BODY MASS INDEX: 30.9 KG/M2 | OXYGEN SATURATION: 97 % | HEIGHT: 64 IN | HEART RATE: 76 BPM | DIASTOLIC BLOOD PRESSURE: 74 MMHG | WEIGHT: 181 LBS | TEMPERATURE: 98.1 F

## 2025-03-10 DIAGNOSIS — Z12.39 ENCOUNTER FOR OTHER SCREENING FOR MALIGNANT NEOPLASM OF BREAST: ICD-10-CM

## 2025-03-10 DIAGNOSIS — F41.9 ANXIETY: ICD-10-CM

## 2025-03-10 DIAGNOSIS — R92.8 ABNORMAL MAMMOGRAM OF BOTH BREASTS: ICD-10-CM

## 2025-03-10 DIAGNOSIS — Z00.00 WELLNESS EXAMINATION: Primary | ICD-10-CM

## 2025-03-10 PROBLEM — J06.9 VIRAL URI: Status: RESOLVED | Noted: 2023-08-26 | Resolved: 2025-03-10

## 2025-03-10 PROBLEM — L02.91 ABSCESS: Status: RESOLVED | Noted: 2023-08-26 | Resolved: 2025-03-10

## 2025-03-10 LAB
ALBUMIN SERPL-MCNC: 4.3 G/DL (ref 3.6–5.1)
ALP SERPL-CCNC: 60 U/L (ref 31–125)
ALT SERPL-CCNC: 14 U/L (ref 6–29)
ANION GAP SERPL CALCULATED.4IONS-SCNC: 9 MMOL/L (CALC) (ref 7–17)
AST SERPL-CCNC: 13 U/L (ref 10–30)
BILIRUB SERPL-MCNC: 0.7 MG/DL (ref 0.2–1.2)
BUN SERPL-MCNC: 14 MG/DL (ref 7–25)
CALCIUM SERPL-MCNC: 8.8 MG/DL (ref 8.6–10.2)
CHLORIDE SERPL-SCNC: 105 MMOL/L (ref 98–110)
CO2 SERPL-SCNC: 26 MMOL/L (ref 20–32)
CREAT SERPL-MCNC: 0.65 MG/DL (ref 0.5–0.97)
EGFRCR SERPLBLD CKD-EPI 2021: 117 ML/MIN/1.73M2
ERYTHROCYTE [DISTWIDTH] IN BLOOD BY AUTOMATED COUNT: 12.5 % (ref 11–15)
GLUCOSE SERPL-MCNC: 91 MG/DL (ref 65–99)
HCT VFR BLD AUTO: 38 % (ref 35–45)
HGB BLD-MCNC: 12.5 G/DL (ref 11.7–15.5)
MCH RBC QN AUTO: 31.3 PG (ref 27–33)
MCHC RBC AUTO-ENTMCNC: 32.9 G/DL (ref 32–36)
MCV RBC AUTO: 95 FL (ref 80–100)
PLATELET # BLD AUTO: 275 THOUSAND/UL (ref 140–400)
PMV BLD REES-ECKER: 9.7 FL (ref 7.5–12.5)
POTASSIUM SERPL-SCNC: 3.9 MMOL/L (ref 3.5–5.3)
PROT SERPL-MCNC: 7 G/DL (ref 6.1–8.1)
RBC # BLD AUTO: 4 MILLION/UL (ref 3.8–5.1)
SODIUM SERPL-SCNC: 140 MMOL/L (ref 135–146)
TSH SERPL-ACNC: 3.25 MIU/L
WBC # BLD AUTO: 3.2 THOUSAND/UL (ref 3.8–10.8)

## 2025-03-10 PROCEDURE — 99395 PREV VISIT EST AGE 18-39: CPT | Performed by: NURSE PRACTITIONER

## 2025-03-10 PROCEDURE — 1036F TOBACCO NON-USER: CPT | Performed by: NURSE PRACTITIONER

## 2025-03-10 PROCEDURE — 3008F BODY MASS INDEX DOCD: CPT | Performed by: NURSE PRACTITIONER

## 2025-03-10 ASSESSMENT — ENCOUNTER SYMPTOMS
NUMBNESS: 0
BLOOD IN STOOL: 0
CHILLS: 0
WHEEZING: 0
APPETITE CHANGE: 0
FATIGUE: 0
HEADACHES: 0
FEVER: 0
LIGHT-HEADEDNESS: 0
NERVOUS/ANXIOUS: 1
CONSTIPATION: 0
ABDOMINAL PAIN: 0
COUGH: 0
PALPITATIONS: 0
NAUSEA: 0
UNEXPECTED WEIGHT CHANGE: 0
SLEEP DISTURBANCE: 0
DIARRHEA: 0
DYSPHORIC MOOD: 0
VOMITING: 0
SHORTNESS OF BREATH: 0

## 2025-03-10 ASSESSMENT — PATIENT HEALTH QUESTIONNAIRE - PHQ9
2. FEELING DOWN, DEPRESSED OR HOPELESS: NOT AT ALL
SUM OF ALL RESPONSES TO PHQ9 QUESTIONS 1 AND 2: 0
1. LITTLE INTEREST OR PLEASURE IN DOING THINGS: NOT AT ALL

## 2025-03-10 NOTE — PROGRESS NOTES
"Prasanth Bernal \"Aisha\" is a 34 y.o. female who presents for Establish Care and Annual Exam.    Patient reported health as : Fair  Regular dental visists : No   Regular eye exams : Yes   Hearing loss : No   Well balanced diet : Yes   Weight Concerns : Yes   Exercise : Regular    HPI  Review of Systems   Constitutional:  Negative for appetite change, chills, fatigue, fever and unexpected weight change.   Respiratory:  Negative for cough, shortness of breath and wheezing.    Cardiovascular:  Negative for chest pain, palpitations and leg swelling.   Gastrointestinal:  Negative for abdominal pain, blood in stool, constipation, diarrhea, nausea and vomiting.   Endocrine: Negative for cold intolerance and heat intolerance.   Neurological:  Negative for light-headedness, numbness and headaches.   Psychiatric/Behavioral:  Negative for dysphoric mood and sleep disturbance. The patient is nervous/anxious (mild, no longer on meds.).        Objective     Physical Exam  Vitals reviewed.   Constitutional:       General: She is not in acute distress.     Appearance: Normal appearance. She is not ill-appearing.   HENT:      Head: Normocephalic.   Eyes:      Conjunctiva/sclera: Conjunctivae normal.   Neck:      Thyroid: No thyroid mass, thyromegaly or thyroid tenderness.   Cardiovascular:      Rate and Rhythm: Normal rate and regular rhythm.      Pulses: Normal pulses.      Heart sounds: No murmur heard.  Pulmonary:      Effort: Pulmonary effort is normal.      Breath sounds: Normal breath sounds.   Abdominal:      General: Bowel sounds are normal.      Palpations: Abdomen is soft.   Musculoskeletal:      Cervical back: Neck supple.      Right lower leg: No edema.      Left lower leg: No edema.   Lymphadenopathy:      Cervical: No cervical adenopathy.   Skin:     General: Skin is warm and dry.   Neurological:      General: No focal deficit present.      Mental Status: She is alert and oriented to person, place, and time. " "  Psychiatric:         Mood and Affect: Mood normal.         Thought Content: Thought content normal.         /74   Pulse 76   Temp 36.7 °C (98.1 °F)   Resp 17   Ht 1.626 m (5' 4\")   Wt 82.1 kg (181 lb)   SpO2 97%   BMI 31.07 kg/m²     Assessment/Plan     Problem List Items Addressed This Visit       Anxiety    Overview     She weaned of Zoloft 50 mg in 12/2024.   Previously w/ Sandy Albrecht CNP for psych needs.           Other Visit Diagnoses       Wellness examination    -  Primary    Relevant Orders    CBC    Comprehensive Metabolic Panel    TSH with reflex to Free T4 if abnormal            "

## 2025-03-16 DIAGNOSIS — D72.829 LEUKOCYTOSIS, UNSPECIFIED TYPE: Primary | ICD-10-CM

## 2025-03-16 PROBLEM — O42.90 PREMATURE RUPTURE OF MEMBRANES: Status: RESOLVED | Noted: 2025-03-16 | Resolved: 2025-03-16

## 2025-03-16 PROBLEM — N63.0 MASS OF BREAST: Status: ACTIVE | Noted: 2023-05-30

## 2025-03-16 PROBLEM — G50.0 TRIGEMINAL NEURALGIA: Status: ACTIVE | Noted: 2023-05-09

## 2025-03-16 PROBLEM — N92.6 MISSED PERIOD: Status: RESOLVED | Noted: 2025-03-16 | Resolved: 2025-03-16

## 2025-03-16 PROBLEM — G50.0 TRIGEMINAL NEURALGIA: Status: RESOLVED | Noted: 2023-05-09 | Resolved: 2025-03-16

## 2025-03-16 PROBLEM — R63.39 DIFFICULTY IN FEEDING AT BREAST: Status: RESOLVED | Noted: 2025-03-16 | Resolved: 2025-03-16

## 2025-03-16 PROBLEM — O99.019 ANEMIA IN PREGNANCY (HHS-HCC): Status: RESOLVED | Noted: 2023-08-26 | Resolved: 2025-03-16

## 2025-03-18 ENCOUNTER — APPOINTMENT (OUTPATIENT)
Dept: RADIOLOGY | Facility: HOSPITAL | Age: 35
End: 2025-03-18
Payer: COMMERCIAL

## 2025-03-19 ENCOUNTER — HOSPITAL ENCOUNTER (OUTPATIENT)
Dept: RADIOLOGY | Facility: HOSPITAL | Age: 35
Discharge: HOME | End: 2025-03-19
Payer: COMMERCIAL

## 2025-03-19 ENCOUNTER — PHARMACY VISIT (OUTPATIENT)
Dept: PHARMACY | Facility: CLINIC | Age: 35
End: 2025-03-19
Payer: COMMERCIAL

## 2025-03-19 DIAGNOSIS — Z12.39 ENCOUNTER FOR OTHER SCREENING FOR MALIGNANT NEOPLASM OF BREAST: ICD-10-CM

## 2025-03-19 PROCEDURE — 77063 BREAST TOMOSYNTHESIS BI: CPT | Performed by: RADIOLOGY

## 2025-03-19 PROCEDURE — 77063 BREAST TOMOSYNTHESIS BI: CPT

## 2025-03-19 PROCEDURE — RXMED WILLOW AMBULATORY MEDICATION CHARGE

## 2025-03-19 PROCEDURE — 77067 SCR MAMMO BI INCL CAD: CPT | Performed by: RADIOLOGY

## 2025-03-19 RX ORDER — AMOXICILLIN AND CLAVULANATE POTASSIUM 875; 125 MG/1; MG/1
TABLET, FILM COATED ORAL
Qty: 14 TABLET | Refills: 0 | OUTPATIENT
Start: 2025-03-19

## 2025-03-23 PROBLEM — R92.8 ABNORMAL MAMMOGRAM OF RIGHT BREAST: Status: RESOLVED | Noted: 2023-08-26 | Resolved: 2025-03-23

## 2025-03-23 PROBLEM — N64.4 BREAST PAIN: Status: RESOLVED | Noted: 2023-08-26 | Resolved: 2025-03-23

## 2025-03-23 PROBLEM — N64.59 ABNORMAL BREAST EXAM: Status: RESOLVED | Noted: 2023-08-26 | Resolved: 2025-03-23

## 2025-03-23 PROBLEM — N63.0 MASS OF BREAST: Status: RESOLVED | Noted: 2023-05-30 | Resolved: 2025-03-23

## 2025-03-23 PROBLEM — R92.8 ABNORMAL MAMMOGRAM: Status: RESOLVED | Noted: 2022-12-30 | Resolved: 2025-03-23

## 2025-03-23 PROBLEM — O99.019 ANEMIA OF PREGNANCY (HHS-HCC): Status: RESOLVED | Noted: 2025-03-23 | Resolved: 2025-03-23

## 2025-03-30 DIAGNOSIS — D72.829 LEUKOCYTOSIS, UNSPECIFIED TYPE: ICD-10-CM

## 2025-05-21 ENCOUNTER — APPOINTMENT (OUTPATIENT)
Dept: OBSTETRICS AND GYNECOLOGY | Facility: CLINIC | Age: 35
End: 2025-05-21
Payer: COMMERCIAL

## 2025-06-25 ENCOUNTER — APPOINTMENT (OUTPATIENT)
Dept: OBSTETRICS AND GYNECOLOGY | Facility: CLINIC | Age: 35
End: 2025-06-25
Payer: COMMERCIAL

## 2025-06-25 VITALS
SYSTOLIC BLOOD PRESSURE: 108 MMHG | WEIGHT: 168.2 LBS | HEIGHT: 64 IN | DIASTOLIC BLOOD PRESSURE: 76 MMHG | BODY MASS INDEX: 28.71 KG/M2

## 2025-06-25 DIAGNOSIS — Z12.4 CERVICAL CANCER SCREENING: ICD-10-CM

## 2025-06-25 DIAGNOSIS — Z01.419 WELL WOMAN EXAM: Primary | ICD-10-CM

## 2025-06-25 DIAGNOSIS — N89.8 VAGINAL DISCHARGE: ICD-10-CM

## 2025-06-25 PROCEDURE — 87626 HPV SEP HI-RSK TYP&POOL RSLT: CPT

## 2025-06-25 PROCEDURE — 1036F TOBACCO NON-USER: CPT

## 2025-06-25 PROCEDURE — 3008F BODY MASS INDEX DOCD: CPT

## 2025-06-25 PROCEDURE — 99395 PREV VISIT EST AGE 18-39: CPT

## 2025-06-25 RX ORDER — SEMAGLUTIDE 1.34 MG/ML
1 INJECTION, SOLUTION SUBCUTANEOUS
COMMUNITY

## 2025-06-25 ASSESSMENT — PATIENT HEALTH QUESTIONNAIRE - PHQ9
1. LITTLE INTEREST OR PLEASURE IN DOING THINGS: NOT AT ALL
2. FEELING DOWN, DEPRESSED OR HOPELESS: NOT AT ALL
SUM OF ALL RESPONSES TO PHQ9 QUESTIONS 1 AND 2: 0

## 2025-06-25 NOTE — PROGRESS NOTES
"Prasanth Bernal \"Aisha\" is a 34 y.o. female who is here for a routine exam. She is sexually active with one partner and has no concern for STI exposure. She has no pain or bleeding with sex. She denies bladder or bowel concerns.    Patient does report concerns for increase in vaginal discharge over the last two months. Patient denies any odor, itching, or irritation.     Current contraception: condoms  LMP: 25  Menses: regular and monthly  Last pap:   History of abnormal Pap smear: no  Due for pap: yes -    Family history of uterine or ovarian cancer: no  Family history of prostate cancer: no  Family history of pancreatic cancer: father with pancreatic CA in 50's  Family hx of BRCA1/BRCA2: no  Family history of breast cancer: PGM in 's  Last mammogram:  cat 2 benign- managed by high risk breast      OB History          3    Para   1    Term   1       0    AB   1    Living   1         SAB   1    IAB   0    Ectopic   0    Multiple   0    Live Births   1                 Review of Systems    Objective   /76 (BP Location: Left arm, Patient Position: Sitting, BP Cuff Size: Adult)   Ht 1.626 m (5' 4\")   Wt 76.3 kg (168 lb 3.2 oz)   LMP 2025   Breastfeeding No   BMI 28.87 kg/m²     Physical Exam  HENT:      Mouth/Throat:      Mouth: Mucous membranes are moist.   Eyes:      Conjunctiva/sclera: Conjunctivae normal.   Neck:      Thyroid: No thyroid mass, thyromegaly or thyroid tenderness.   Cardiovascular:      Rate and Rhythm: Normal rate and regular rhythm.      Pulses: Normal pulses.   Pulmonary:      Effort: Pulmonary effort is normal.      Breath sounds: Normal breath sounds.   Chest:   Breasts:     Breasts are symmetrical.      Right: Normal.      Left: Normal.      Comments: Multiple scars notes on right breast and one on left breast  Abdominal:      General: Abdomen is flat.      Palpations: Abdomen is soft.      Hernia: There is no hernia in the left inguinal area " or right inguinal area.   Genitourinary:     General: Normal vulva.      Vagina: Normal.      Cervix: Normal.      Uterus: Normal.       Adnexa: Right adnexa normal and left adnexa normal.      Comments: Pap collected  Musculoskeletal:         General: Normal range of motion.      Cervical back: Normal range of motion.   Lymphadenopathy:      Cervical: No cervical adenopathy.      Right cervical: No superficial, deep or posterior cervical adenopathy.     Left cervical: No superficial, deep or posterior cervical adenopathy.      Lower Body: No right inguinal adenopathy. No left inguinal adenopathy.   Skin:     General: Skin is warm.      Capillary Refill: Capillary refill takes less than 2 seconds.   Neurological:      Mental Status: She is alert and oriented to person, place, and time.   Psychiatric:         Mood and Affect: Mood normal.         Behavior: Behavior normal.          Assessment/Plan   A&P --> 34 y.o. y.o woman for annual GYN exam.     - Health Maintenance -->  - Routine follow up with PCP for health maintenance examination encouraged, including TSH, cholesterol, and Vit. D evaluation.  Self breast awareness encouraged; concerning characteristics of breasts reviewed - Pt. will report general concerns, any adherent lumps, skin dimpling/puckering or color changes, and any nipple discharge.    Diet and exercise reviewed.     Pap today and if wnl, next pap in 5 years:- Reviewed ACOG and ASCCP guidelines with pt.     Vaginal discharge: Vaginitis culture obtained today and will treat based on results     - STD Screening: declines     - Contraception Plan: condoms     - F/U 1 year or as needed.    DARREL Hendrickson

## 2025-06-26 LAB — BV SCORE VAG QL: NORMAL

## 2025-07-07 LAB
CYTOLOGY CMNT CVX/VAG CYTO-IMP: NORMAL
HPV HR 12 DNA GENITAL QL NAA+PROBE: NEGATIVE
HPV HR GENOTYPES PNL CVX NAA+PROBE: NEGATIVE
HPV16 DNA SPEC QL NAA+PROBE: NEGATIVE
HPV18 DNA SPEC QL NAA+PROBE: NEGATIVE
LAB AP HPV GENOTYPE QUESTION: YES
LAB AP HPV HR: NORMAL
LABORATORY COMMENT REPORT: NORMAL
PATH REPORT.TOTAL CANCER: NORMAL

## 2026-03-10 ENCOUNTER — APPOINTMENT (OUTPATIENT)
Dept: PRIMARY CARE | Facility: CLINIC | Age: 36
End: 2026-03-10
Payer: COMMERCIAL